# Patient Record
Sex: MALE | Race: WHITE
[De-identification: names, ages, dates, MRNs, and addresses within clinical notes are randomized per-mention and may not be internally consistent; named-entity substitution may affect disease eponyms.]

---

## 2017-02-23 ENCOUNTER — HOSPITAL ENCOUNTER (OUTPATIENT)
Dept: HOSPITAL 39 - GMAL | Age: 82
Discharge: HOME | End: 2017-02-23
Attending: FAMILY MEDICINE
Payer: MEDICARE

## 2017-02-23 DIAGNOSIS — E55.9: ICD-10-CM

## 2017-02-23 DIAGNOSIS — I10: Primary | ICD-10-CM

## 2017-02-23 DIAGNOSIS — D51.3: ICD-10-CM

## 2017-02-27 ENCOUNTER — HOSPITAL ENCOUNTER (OUTPATIENT)
Dept: HOSPITAL 39 - GMAL | Age: 82
Discharge: HOME | End: 2017-02-27
Attending: FAMILY MEDICINE
Payer: MEDICARE

## 2017-02-27 DIAGNOSIS — R94.6: Primary | ICD-10-CM

## 2017-04-10 ENCOUNTER — HOSPITAL ENCOUNTER (OUTPATIENT)
Dept: HOSPITAL 39 - GMAL | Age: 82
Discharge: HOME | End: 2017-04-10
Attending: FAMILY MEDICINE
Payer: MEDICARE

## 2017-04-10 DIAGNOSIS — R53.83: Primary | ICD-10-CM

## 2017-06-08 ENCOUNTER — HOSPITAL ENCOUNTER (OUTPATIENT)
Dept: HOSPITAL 39 - GMAL | Age: 82
Discharge: HOME | End: 2017-06-08
Attending: FAMILY MEDICINE
Payer: MEDICARE

## 2017-06-08 DIAGNOSIS — R53.83: Primary | ICD-10-CM

## 2017-09-07 ENCOUNTER — HOSPITAL ENCOUNTER (OUTPATIENT)
Dept: HOSPITAL 39 - GMAL | Age: 82
Discharge: HOME | End: 2017-09-07
Attending: FAMILY MEDICINE
Payer: MEDICARE

## 2017-09-07 DIAGNOSIS — R53.83: Primary | ICD-10-CM

## 2018-03-22 ENCOUNTER — HOSPITAL ENCOUNTER (EMERGENCY)
Dept: HOSPITAL 39 - ER | Age: 83
Discharge: HOME | End: 2018-03-22
Payer: MEDICARE

## 2018-03-22 VITALS — OXYGEN SATURATION: 97 % | DIASTOLIC BLOOD PRESSURE: 97 MMHG | TEMPERATURE: 97 F | SYSTOLIC BLOOD PRESSURE: 162 MMHG

## 2018-03-22 DIAGNOSIS — I10: ICD-10-CM

## 2018-03-22 DIAGNOSIS — S03.03XA: Primary | ICD-10-CM

## 2018-03-22 DIAGNOSIS — Z85.89: ICD-10-CM

## 2018-03-22 DIAGNOSIS — Z79.82: ICD-10-CM
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
## 2018-03-22 DIAGNOSIS — Z87.891: ICD-10-CM

## 2018-03-22 DIAGNOSIS — Z79.899: ICD-10-CM

## 2018-03-22 PROCEDURE — 84481 FREE ASSAY (FT-3): CPT

## 2018-03-22 PROCEDURE — 70486 CT MAXILLOFACIAL W/O DYE: CPT

## 2018-03-22 PROCEDURE — 70110 X-RAY EXAM OF JAW 4/> VIEWS: CPT

## 2018-03-22 PROCEDURE — 84439 ASSAY OF FREE THYROXINE: CPT

## 2018-03-22 PROCEDURE — 93005 ELECTROCARDIOGRAM TRACING: CPT

## 2018-03-22 PROCEDURE — 84443 ASSAY THYROID STIM HORMONE: CPT

## 2018-03-22 NOTE — ED.PDOC
History of Present Illness





- General


Chief Complaint: ENT Problem


Stated Complaint: unable to close mouth/Jaw pain


Time Seen by Provider: 03/22/18 04:42


Source: patient


Exam Limitations: no limitations





- History of Present Illness


Initial Comments: 





Jimi Cabrera 85 y/o male arrive by POV-drove himself unable to close his mouth and 

bilateral jaw pain which happened early this am and could not talk since mouth 

don't closed.


Timing/Duration: abrupt


Severity: moderate


EENT Location: mouth - unable to close mouth


Prearrival Treatment: no prearrival treatment


Presenting Symptoms: mouth unable to close


Improving Factors: nothing


Worsening Factors: nothing


Associated Symptoms: other - see hpi


Allergies/Adverse Reactions: 


Allergies





Amoxicillin Allergy (Verified 01/07/13 13:58)


 


Meperidine [From Demerol HCl] Allergy (Verified 07/21/14 20:25)


 


Piroxicam [From Feldene] Allergy (Verified 01/07/13 13:58)


 








Home Medications: 


Ambulatory Orders





Aspirin [Aspirin EC] 81 mg PO DAILY 07/21/14 


Atorvastatin Calcium [Lipitor] 40 mg PO DAILY 07/21/14 


Clonidine HCl 0.2 mg TD WKLY 07/21/14 


Ferrous Sulfate 325 mg PO DAILY 07/21/14 


Olmesartan Medoxomil-Hydrochlo [Benicar Hct 40-25 mg] 1 tab PO DAILY 07/21/14 


Ipratropium/Albuterol [Duoneb] 3 ml INH TID #1 pack 07/23/14 


Methotrexate Sodium [Methotrexate] 15 mg PO WKLY #0 07/23/14 


levoFLOXacin 500MG IV [Levaquin 500MG IV] 500 mg IVPB Q24H #21 bag 07/23/14 


predniSONE 20 mg PO DAILY #30 tab 07/23/14 











Review of Systems





- Review of Systems


Constitutional: States: no symptoms reported


EENTM: States: see HPI, other - unable to close mouth


Respiratory: States: no symptoms reported


Cardiology: States: no symptoms reported


Gastrointestinal/Abdominal: States: no symptoms reported


Endocrine: States: no symptoms reported


All other Systems: Reviewed and Negative, No Change from Baseline





Past Medical History (General)





- Patient Medical History


Hx Seizures: No


Hx Stroke: No


Hx Asthma: No


Hx of COPD: No


Hx Cardiac Disorders: No


Hx Congestive Heart Failure: No


Hx Pacemaker: No


Hx Hypertension: Yes


Hx Diabetes: No


Hx Gastroesophageal Reflux: No


Hx Cancer: Yes - jaw


Hx MRSA: No


Surgical History: other - jaw surgery





- Social History


Hx Tobacco Use: Yes


Hx Alcohol Use: No


Hx Substance Use: No


Hx Physical Abuse: No


Hx Emotional Abuse: No





Family Medical History





- Family History


  ** Mother


Family History: Unknown


Hx Cardiac Disease: Yes





Physical Exam





- Physical Exam


General Appearance: Alert, No apparent distress


Eye Exam: bilateral normal


Ear Exam: bilateral ear: auricle normal


Nasal Exam: normal inspection


Throat Exam: other - unable to close mouth,mouth dry





Progress





- Progress


Progress: 





03/22/18 06:08


 Vital Signs - 8 hr











  03/22/18 03/22/18 03/22/18





  05:15 05:56 06:00


 


Temperature 100.0 F H 100.0 F H 


 


Pulse Rate [ 96 H 66 93 H





left]   


 


Respiratory 16 16 16





Rate   


 


Blood Pressure 191/135 107/68 191/95





[left]   


 


O2 Sat by Pulse 90 L 96 94 L





Oximetry   











03/22/18 06:09


Patient was given Dilaudid -1mg iv;Versed 1mg iv then after he was mildly 

sedated jaw was pushed down and pushed backward then reversal of dilaudid and 

versed done with narcan and flumazenil after BP systolic was noted to be 107 mm 

Hg;then he was able to talk to daughter and closed his mouth





- EKG/XRAY/CT


EKG: Sinus, no ST T wave changes


Comments: Heart Rate-78;LAFB


CT Ordered: Yes - mandible -anterior dislocation





Departure





- Departure


Clinical Impression: 


 Pain in lower jaw, History of mandibular surgery





Closed dislocation of mandible


Qualifiers:


 Encounter type: initial encounter Qualified Code(s): S03.00XA - Dislocation of 

jaw, unspecified side, initial encounter





Time of Disposition: 06:16


Disposition: Discharge to Home or Self Care


Condition: Fair


Departure Forms:  ED Discharge - Pt. Copy, Patient Portal Self Enrollment


Instructions:  DI for Jaw Dislocation


Diet: full liquid diet, other - soft diet 


Referrals: 


Nnamdi Alva III, MD [Primary Care Provider] - 1-2 Weeks


Home Medications: 


Ambulatory Orders





Aspirin [Aspirin EC] 81 mg PO DAILY 07/21/14 


Atorvastatin Calcium [Lipitor] 40 mg PO DAILY 07/21/14 


Clonidine HCl 0.2 mg TD WKLY 07/21/14 


Ferrous Sulfate 325 mg PO DAILY 07/21/14 


Olmesartan Medoxomil-Hydrochlo [Benicar Hct 40-25 mg] 1 tab PO DAILY 07/21/14 


Ipratropium/Albuterol [Duoneb] 3 ml INH TID #1 pack 07/23/14 


Methotrexate Sodium [Methotrexate] 15 mg PO WKLY #0 07/23/14 


levoFLOXacin 500MG IV [Levaquin 500MG IV] 500 mg IVPB Q24H #21 bag 07/23/14 


predniSONE 20 mg PO DAILY #30 tab 07/23/14 








Additional Instructions: 


Return to emergency room as needed

## 2018-03-22 NOTE — CT
EXAM: NONCONTRAST FACIAL CT EXAMINATION.



CLINICAL INDICATION: Patient unable to close his mouth.



COMPARISON:  Today's radiographs of the mandible. 



TECHNIQUE: Using low dose helical technique, thin section axial

images were performed through the facial bones without the

administration of intravenous or subarachnoid contrast material. 

CT sagittal and coronal reconstructions were obtained.



FINDINGS: Bilateral anterior mandibular condyle dislocation. No

fractures. Sequela of remote left mandibular ORIF.



The paranasal sinuses are clear. Globes and orbits are grossly

normal.



Nasopharyngeal soft tissue appears grossly normal.



Moderate to severe atherosclerotic calcification in the cavernous

internal carotid arteries. 



IMPRESSION:

1. Bilateral mandibular condyle dislocation without fracture.



This exam was performed according to our departmental

dose-optimization program, which includes automated exposure

control, adjustment of the mA and/or kV according to patient size

and/or use of iterative reconstruction technique.



Electronically signed by:  Tee Shelby MD  3/22/2018 5:36 AM

CDT Workstation: [id number]

## 2018-03-22 NOTE — RAD
4 VIEWS MANDIBLE RADIOGRAPHIC SERIES.



REFERRAL DIAGNOSIS: Patient unable to close his mouth.



COMPARISONS: None.



FINDINGS: Suspect left mandibular condyle anterior dislocation.

Sequela of extensive remote mandibular ORIF.



IMPRESSION: Suspect left mandibular condyle anterior dislocation.



Electronically signed by:  Tee Shelby MD  3/22/2018 5:33 AM

CDT Workstation: [id number]

## 2018-06-21 ENCOUNTER — HOSPITAL ENCOUNTER (OUTPATIENT)
Dept: HOSPITAL 39 - GMAL | Age: 83
End: 2018-06-21
Attending: FAMILY MEDICINE
Payer: MEDICARE

## 2018-06-21 DIAGNOSIS — D53.9: ICD-10-CM

## 2018-06-21 DIAGNOSIS — D51.3: Primary | ICD-10-CM

## 2018-10-30 ENCOUNTER — HOSPITAL ENCOUNTER (OUTPATIENT)
Dept: HOSPITAL 39 - GMAL | Age: 83
End: 2018-10-30
Attending: FAMILY MEDICINE
Payer: MEDICARE

## 2018-10-30 DIAGNOSIS — E55.9: ICD-10-CM

## 2018-10-30 DIAGNOSIS — D51.3: Primary | ICD-10-CM

## 2018-10-30 DIAGNOSIS — E03.9: ICD-10-CM

## 2018-12-13 ENCOUNTER — HOSPITAL ENCOUNTER (OUTPATIENT)
Dept: HOSPITAL 39 - GMAJ | Age: 83
End: 2018-12-13
Attending: FAMILY MEDICINE
Payer: MEDICARE

## 2018-12-13 DIAGNOSIS — E03.9: Primary | ICD-10-CM

## 2019-01-30 ENCOUNTER — HOSPITAL ENCOUNTER (OUTPATIENT)
Dept: HOSPITAL 39 - GMAL | Age: 84
End: 2019-01-30
Attending: FAMILY MEDICINE
Payer: MEDICARE

## 2019-01-30 DIAGNOSIS — E03.9: Primary | ICD-10-CM

## 2019-07-30 ENCOUNTER — HOSPITAL ENCOUNTER (OUTPATIENT)
Dept: HOSPITAL 39 - GMAL | Age: 84
End: 2019-07-30
Attending: FAMILY MEDICINE
Payer: MEDICARE

## 2019-07-30 DIAGNOSIS — E03.9: Primary | ICD-10-CM

## 2019-07-30 DIAGNOSIS — I10: ICD-10-CM

## 2019-11-11 ENCOUNTER — HOSPITAL ENCOUNTER (OUTPATIENT)
Dept: HOSPITAL 39 - GMAL | Age: 84
End: 2019-11-11
Attending: FAMILY MEDICINE
Payer: MEDICARE

## 2019-11-11 DIAGNOSIS — I10: ICD-10-CM

## 2019-11-11 DIAGNOSIS — E03.9: Primary | ICD-10-CM

## 2020-05-07 ENCOUNTER — HOSPITAL ENCOUNTER (OUTPATIENT)
Dept: HOSPITAL 39 - GMAL | Age: 85
End: 2020-05-07
Attending: FAMILY MEDICINE
Payer: MEDICARE

## 2020-05-07 DIAGNOSIS — Z79.899: ICD-10-CM

## 2020-05-07 DIAGNOSIS — E03.9: Primary | ICD-10-CM

## 2020-05-07 DIAGNOSIS — I10: ICD-10-CM

## 2020-05-18 ENCOUNTER — HOSPITAL ENCOUNTER (EMERGENCY)
Dept: HOSPITAL 39 - ER | Age: 85
Discharge: HOME | End: 2020-05-18
Payer: MEDICARE

## 2020-05-18 VITALS — SYSTOLIC BLOOD PRESSURE: 142 MMHG | TEMPERATURE: 98.6 F | DIASTOLIC BLOOD PRESSURE: 67 MMHG

## 2020-05-18 VITALS — OXYGEN SATURATION: 93 %

## 2020-05-18 DIAGNOSIS — Z88.8: ICD-10-CM

## 2020-05-18 DIAGNOSIS — R91.1: ICD-10-CM
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
## 2020-05-18 DIAGNOSIS — D64.9: ICD-10-CM

## 2020-05-18 DIAGNOSIS — Z88.1: ICD-10-CM
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
## 2020-05-18 DIAGNOSIS — S83.92XA: Primary | ICD-10-CM

## 2020-05-18 DIAGNOSIS — Z85.89: ICD-10-CM

## 2020-05-18 DIAGNOSIS — Z79.82: ICD-10-CM

## 2020-05-18 DIAGNOSIS — Z79.899: ICD-10-CM

## 2020-05-18 DIAGNOSIS — Z87.891: ICD-10-CM

## 2020-05-18 DIAGNOSIS — I10: ICD-10-CM

## 2020-05-18 PROCEDURE — 85025 COMPLETE CBC W/AUTO DIFF WBC: CPT

## 2020-05-18 PROCEDURE — 71045 X-RAY EXAM CHEST 1 VIEW: CPT

## 2020-05-18 PROCEDURE — 73502 X-RAY EXAM HIP UNI 2-3 VIEWS: CPT

## 2020-05-18 PROCEDURE — 93005 ELECTROCARDIOGRAM TRACING: CPT

## 2020-05-18 PROCEDURE — 73562 X-RAY EXAM OF KNEE 3: CPT

## 2020-05-18 PROCEDURE — 80048 BASIC METABOLIC PNL TOTAL CA: CPT

## 2020-05-18 PROCEDURE — 36415 COLL VENOUS BLD VENIPUNCTURE: CPT

## 2020-05-18 NOTE — RAD
EXAM: Knee,Left Complete



CLINICAL INDICATION: 86-year-old male with pain status post fall.



TECHNIQUE: Three views LEFT knee were obtained in AP, lateral and

patellar projections



COMPARISON: None.



FINDINGS:  There is no fracture or dislocation. The joint spaces

are preserved. No soft tissue abnormalities are seen.

Postoperative changes with surgical clips at the level of the

fibular diaphysis noted. Degenerative changes are identified with

sharpening of the tibial spines and tricompartmental osteophytes.

Vascular calcification present throughout the soft tissues.



IMPRESSION: No acute radiographic abnormality. 



Electronically signed by:  Lanette Anguiano MD  5/18/2020 7:33 PM CDT

Workstation: [id number]

## 2020-05-18 NOTE — ED.PDOC
History of Present Illness





- General


Chief Complaint: Trauma


Stated Complaint: Fall with left knee injury


Time Seen by Provider: 20 18:27


Source: patient, RN notes reviewed, Vital Signs reviewed, EMS notes reviewed, 

family





- History of Present Illness


Initial Comments: 





This is an 86-year-old male who presents to the emergency department with wife 

after a ground-level fall that occurred in his front yard.  Patient states he 

had been working on building a tool throughout the day today, stood up, and knee

buckled and he fell to the ground.  He did not hit his head, he did not lose 

consciousness.  He denies any headache, changes in mental status.  He states he 

had difficulty walking due to the pain but states he was able to stand up and 

walk to a chair after the fall occurred.  No back pain or neck pain.  He has 

multiple skin tears, all appear to be superficial.  Last Tdap was 2 to 3 years 

ago.He denied any preceding chest pain, shortness of breath or dizziness.  No 

sick contacts, no known COVID contacts, no recent admissions or 

hospitalizations.


Allergies/Adverse Reactions: 


Allergies





Amoxicillin Allergy (Verified 13 13:58)


   


Meperidine [From Demerol HCl] Allergy (Verified 14 20:25)


   


Piroxicam [From Feldene] Allergy (Verified 13 13:58)


   





Home Medications: 


Ambulatory Orders





RX: Aspirin [Aspirin EC] 81 mg PO DAILY 14 


RX: Atorvastatin Calcium [Lipitor] 40 mg PO DAILY 14 


RX: Clonidine HCl 0.2 mg TD WKLY 14 


RX: Ferrous Sulfate 325 mg PO DAILY 14 


RX: Olmesartan Medoxomil-Hydrochlo [Benicar Hct 40-25 mg] 1 tab PO DAILY 

14 


Ipratropium/Albuterol [Duoneb] 3 ml INH TID #1 pack 14 


RX: Methotrexate Sodium [Methotrexate] 15 mg PO WKLY #0 14 


RX: levoFLOXacin 500MG IV [Levaquin 500MG IV] 500 mg IVPB Q24H #21 bag 14 


RX: predniSONE 20 mg PO DAILY #30 tab 14 


RX: Tramadol HCl 50 mg PO Q6H PRN #20 tab 20 











Review of Systems





- Review of Systems


Constitutional: Denies: chills, fever


EENTM: Denies: double vision, nose pain, throat pain, mouth pain


Respiratory: Denies: orthopnea, short of breath


Cardiology: Denies: chest pain, edema, palpitations, syncope


Gastrointestinal/Abdominal: Denies: constipation, diarrhea, nausea, vomiting


Genitourinary: Denies: dysuria


Musculoskeletal: States: joint pain, joint swelling.  Denies: back pain, muscle 

pain, muscle stiffness, neck pain


Skin: Denies: lesions, lumps, rash


Neurological: Denies: headache, numbness, paresthesia, tingling, tremors, 

weakness


Endocrine: States: no symptoms reported


Hematologic/Lymphatic: States: no symptoms reported





Past Medical History (General)





- Patient Medical History


Hx Seizures: No


Hx Stroke: No


Hx Asthma: No


Hx of COPD: No


Hx Cardiac Disorders: No


Hx Congestive Heart Failure: No


Hx Pacemaker: No


Hx Hypertension: Yes


Hx Diabetes: No


Hx Gastroesophageal Reflux: No


Hx Cancer: Yes - jaw


Hx MRSA: No





- Vaccination History


Hx Tetanus, Diphtheria Vaccination: Yes


Hx Influenza Vaccination: Yes


Hx Pneumococcal Vaccination: Yes





- Social History


Hx Tobacco Use: Yes


Hx Alcohol Use: No


Hx Substance Use: No


Hx Physical Abuse: No


Hx Emotional Abuse: No





- Activities of Daily Living


Hospice Agency (if applicable):: None





- Female History


Patient is a Female of Child Bearing Age (10 -59 yrs old): No





- Triage Comment


ED Triage Comment: pt voices he was standing in yard and his leg "just fell out 

from under me". Pt voices he fell on his left knee. two skin tears ntoed to left

arm. pt alert and orientedx3, converses with ease. follows commands. no n/v 

reported by pt. pt denies abdominal pain or burning during urination.





Family Medical History





- Family History


  ** Mother


Family History: Unknown


Living Status: 


Hx Cardiac Disease: Yes





Physical Exam





- Physical Exam


General Appearance: Alert, Frail, No apparent distress


Eye Exam: bilateral normal


Ears, Nose, Throat: normal ENT inspection, normal pharynx, other - Old 

postoperative defect to the mandible and chin from previous cancer surgery and 

mandibular reconstruction


Neck: non-tender, full range of motion


Respiratory: chest non-tender, lungs clear, normal breath sounds, no respiratory

distress


Cardiovascular/Chest: normal peripheral pulses, regular rate, rhythm, no edema, 

no gallop


Peripheral Pulses: radial,right: 2+, radial,left: 2+, dorsalis pedis,right: 2+, 

dorsalis pedis,left: 2+, posterior tibialis,right: 2+, posterior tibialis,left: 

2+


Gastrointestinal/Abdominal: non tender, soft


Back Exam: normal inspection, no CVA tenderness, no vertebral tenderness


Extremity: other - There is tenderness and mild swelling over the left patella 

and left medial knee, pain with range of motion of the left knee.  Left hip is 

nontender with full range of motion.  Compartments are soft.


Neurologic: CNs II-XII nml as tested, no motor/sensory deficits, alert, oriented

x 3


Skin Exam: normal color, warm/dry





Progress





- Progress


Progress: 





20 01:58


Late entry.  I discussed the case with patient and wife, reviewed labs and plan 

for discharge home with knee immobilizer.  Patient was able to bear weight with 

a steady gait to the wheelchair.  He has a walker at home, which I recommended 

he use at all times.  Will give contact information for orthopedist, and I susanne

mmended again appointment in 3 to 5 days for recheck.  Strict warnings given to 

return the emergency room for headache, changes in mental status, difficulty 

walking, increased pain, or any other concerns.











DDX: Fracture, strain, sprain, dislocation, anemia, skin tears








DO Robert Lloyd #[id number]





- Results/Orders


Results/Orders: 





EXAM: Knee,Left Complete  CLINICAL INDICATION: 86-year-old male with pain status

post fall.  TECHNIQUE: Three views LEFT knee were obtained in AP, lateral and 

patellar projections  COMPARISON: None.  FINDINGS: There is no fracture or 

dislocation. The joint spaces are preserved. No soft tissue abnormalities are 

seen. Postoperative changes with surgical clips at the level of the fibular 

diaphysis noted. Degenerative changes are identified with sharpening of the 

tibial spines and tricompartmental osteophytes. Vascular calcification present 

throughout the soft tissues.  IMPRESSION: No acute radiographic abnormality.  

Electronically signed by: Lanette Anguiano MD 2020 7:33 PM CDT








EXAM: Hip,Left 2 Views  CLINICAL INDICATION: 86-year-old male with pain status 

post fall.  COMPARISON: None.  TECHNIQUE: Two views of the LEFT hip were 

obtained in AP and lateral projection.  FINDINGS: There is no fracture or 

dislocation. The joint spaces are preserved. No soft tissue abnormalities are 

seen.  Large volume of fecal content present at the level of the rectum raising 

the question of fecal stasis or constipation.  IMPRESSION: No acute radiographic

abnormality.  Electronically signed by: Lanette Anguiano MD 2020 7:32 PM CDT








EXAM DESCRIPTION:  Chest,1 View  CLINICAL HISTORY:  86 years Male fall, low O2 

sats  COMPARISON:  2014.  TECHNIQUE:  AP view of the chest was 

obtained.  FINDINGS:  Cardiac size is within normal limits. Central vessels are 

noted increased.  Eventration left hemidiaphragm. Previously noted airspace 

opacities right lung base decreased. Chronic parenchymal changes left lung base 

unchanged. Possible nodularity supralateral right upper hemithorax as well as 

left mid lung.  No effusions bilaterally. No pneumothorax.  IMPRESSION:  COPD. 

Possible nodularity lateral right upper lung as well as left mid lung. These 

findings are not well visualized on prior studies. Consider nonemergency CT scan

of chest for further characterization.  No evidence for infiltrate or congestive

heart failure.  Electronically signed by: Tamara Coreas MD 2020 8:37 PM CDT








                                        





20 18:29


Dressing Change,Simple .PRN 





20 18:30


EKG STAT 





20 20:28


Immobilizer .PRN 








                         Laboratory Results - last 24 hr











  20





  18:51 18:51


 


WBC  11.3 H 


 


RBC  3.29 L 


 


Hgb  9.5 L 


 


Hct  29.0 L 


 


MCV  88.2 


 


MCH  28.9 


 


MCHC  32.7 L 


 


RDW  15.4 H 


 


Plt Count  238 


 


MPV  8.5 


 


Absolute Neuts (auto)  9.70 H 


 


Absolute Lymphs (auto)  0.50 L 


 


Absolute Monos (auto)  0.90 H 


 


Absolute Eos (auto)  0.10 


 


Absolute Basos (auto)  0.00 


 


Neutrophils %  86.2 H 


 


Lymphocytes %  4.5 L 


 


Monocytes %  8.4 


 


Eosinophils %  0.7 L 


 


Basophils %  0.2 


 


Sodium   138


 


Potassium   4.8


 


Chloride   107


 


Carbon Dioxide   23


 


Anion Gap   12.8


 


BUN   61 H


 


Creatinine   3.12 H


 


BUN/Creatinine Ratio   19.6


 


Random Glucose   131 H


 


Serum Osmolality   294.7


 


Calcium   8.8














Departure





- Departure


Clinical Impression: 


 Fall at home, Left knee sprain, Pulmonary nodule, Chronic anemia





Disposition: Discharge to Home or Self Care


Condition: Good


Health Concerns: 


Follow-up with your primary doctor in 3 to 5 days to arrange for outpatient CT 

chest to further evaluate and characterize pulmonary nodules noted on chest x-

ray today.


Departure Forms:  ED Discharge - Pt. Copy, Patient Portal Self Enrollment


Instructions:  DI for Trauma, Knee Immobilizer (DC), Knee Sprain (DC)


Diet: resume usual diet


Activity: ambulate only with walker


Referrals: 


Nnamdi Alva III, MD [Primary Care Provider] - 1-5 Days


Yinka Rodríguez MD [Active Staff] - 1-5 Days


Prescriptions: 


RX: Tramadol HCl 50 mg PO Q6H PRN #20 tab


 PRN Reason: Moderate To Severe Pain


Home Medications: 


Ambulatory Orders





RX: Aspirin [Aspirin EC] 81 mg PO DAILY 14 


RX: Atorvastatin Calcium [Lipitor] 40 mg PO DAILY 14 


RX: Clonidine HCl 0.2 mg TD WKLY 14 


RX: Ferrous Sulfate 325 mg PO DAILY 14 


RX: Olmesartan Medoxomil-Hydrochlo [Benicar Hct 40-25 mg] 1 tab PO DAILY 

14 


Ipratropium/Albuterol [Duoneb] 3 ml INH TID #1 pack 14 


RX: Methotrexate Sodium [Methotrexate] 15 mg PO WKLY #0 14 


RX: levoFLOXacin 500MG IV [Levaquin 500MG IV] 500 mg IVPB Q24H #21 bag 14 


RX: predniSONE 20 mg PO DAILY #30 tab 14 


RX: Tramadol HCl 50 mg PO Q6H PRN #20 tab 20

## 2020-05-18 NOTE — RAD
EXAM DESCRIPTION:



 Chest,1 View



CLINICAL HISTORY: 



86 years  Male  fall, low O2 sats



COMPARISON: 



July 22, 2014.



TECHNIQUE: 



AP view of the chest was obtained.



FINDINGS: 



Cardiac size is within normal limits. Central vessels are noted

increased.



Eventration left hemidiaphragm. Previously noted airspace

opacities right lung base decreased. Chronic parenchymal changes

left lung base unchanged. Possible nodularity supralateral right

upper hemithorax as well as left mid lung.



No effusions bilaterally. No pneumothorax.



IMPRESSION: 



COPD. Possible nodularity lateral right upper lung as well as

left mid lung. These findings are not well visualized on prior

studies. Consider nonemergency CT scan of chest for further

characterization.



No evidence for infiltrate or congestive heart failure.



Electronically signed by:  Tamara Coreas MD  5/18/2020 8:37 PM CDT

Workstation: [id number]

## 2020-05-18 NOTE — RAD
EXAM: Hip,Left 2 Views



CLINICAL INDICATION: 86-year-old male with pain status post fall.



COMPARISON: None.



TECHNIQUE: Two views of the LEFT hip were obtained in AP and

lateral projection.



FINDINGS: There is no fracture or dislocation. The joint spaces

are preserved. No soft tissue abnormalities are seen. 



Large volume of fecal content present at the level of the rectum

raising the question of fecal stasis or constipation.



IMPRESSION: No acute radiographic abnormality. 



Electronically signed by:  Lanette Anguiano MD  5/18/2020 7:32 PM CDT

Workstation: [id number]

## 2020-11-16 ENCOUNTER — HOSPITAL ENCOUNTER (OUTPATIENT)
Dept: HOSPITAL 39 - GMAL | Age: 85
End: 2020-11-16
Attending: FAMILY MEDICINE
Payer: MEDICARE

## 2020-11-16 DIAGNOSIS — D63.1: ICD-10-CM

## 2020-11-16 DIAGNOSIS — N18.9: ICD-10-CM

## 2020-11-16 DIAGNOSIS — I10: ICD-10-CM

## 2020-11-16 DIAGNOSIS — E03.9: ICD-10-CM

## 2020-11-16 DIAGNOSIS — E55.9: ICD-10-CM

## 2020-11-16 DIAGNOSIS — Z79.899: ICD-10-CM

## 2020-11-16 DIAGNOSIS — D51.3: Primary | ICD-10-CM

## 2021-03-01 ENCOUNTER — HOSPITAL ENCOUNTER (OUTPATIENT)
Dept: HOSPITAL 39 - GMAL | Age: 86
End: 2021-03-01
Attending: FAMILY MEDICINE
Payer: MEDICARE

## 2021-03-01 DIAGNOSIS — R53.83: Primary | ICD-10-CM
